# Patient Record
(demographics unavailable — no encounter records)

---

## 2024-12-12 NOTE — HISTORY OF PRESENT ILLNESS
[FreeTextEntry6] : 19 y/o presents with coughing x 5 days. Fever (tactile), body aches, chills x 2-3 days and diarrhea x 3 days.  He feels as though he may be wheezing. Hx of questionable asthma but dad states that it turned out to be just allergies. Used to use a nebulizer but never used an inhaler and has not used a nebulizer in years. Also with a cold sore on his lip.

## 2024-12-12 NOTE — REVIEW OF SYSTEMS
[Fever] : fever [Chills] : chills [Malaise] : malaise [Headache] : headache [Nasal Discharge] : nasal discharge [Nasal Congestion] : nasal congestion [Sore Throat] : sore throat [Cough] : cough [Diarrhea] : diarrhea [Negative] : Genitourinary

## 2024-12-12 NOTE — DISCUSSION/SUMMARY
[FreeTextEntry1] : 18 year male with bronchopneumonia. Pt is to start antibiotics as prescribed. Rapid strep, covid, flu all negative. Continue supportive care for nasal congestion and cough as well. Encourage fluids and rest. Return to office next week for lung check. Return sooner if no improvement or worsening symptoms.  Acyclovir sent in for cold sore-- best to be treated when tingling and sore just start to appear in the future.  Total time dedicated to this patient's visit includes preparing to see patient (reviewing chart, any pertinent labs/consults, etc.), obtaining and/or reviewing separately obtained history from patient and parent, performing medical examination, evaluation, counseling and educating patient/parent, ordering any needed medications and/or labs, documenting clinical information in the electronic record, reviewing any results subsequent to the orders placed during visit and discussing with patient/parent. Total time spent: 30 minutes.

## 2024-12-12 NOTE — PHYSICAL EXAM
[Clear Rhinorrhea] : clear rhinorrhea [Hypertrophied Nasal Mucosa] : hypertrophied nasal mucosa [NL] : clear to auscultation bilaterally [FreeTextEntry7] : productive harsh cough, cough elicited with deep inhalations. O2 98% [de-identified] : cold sore vesicular to upper lip border left side